# Patient Record
Sex: MALE | Race: WHITE | NOT HISPANIC OR LATINO | URBAN - METROPOLITAN AREA
[De-identification: names, ages, dates, MRNs, and addresses within clinical notes are randomized per-mention and may not be internally consistent; named-entity substitution may affect disease eponyms.]

---

## 2018-12-12 ENCOUNTER — APPOINTMENT (OUTPATIENT)
Dept: RADIOLOGY | Facility: CLINIC | Age: 38
End: 2018-12-12
Payer: COMMERCIAL

## 2018-12-12 ENCOUNTER — OFFICE VISIT (OUTPATIENT)
Dept: URGENT CARE | Facility: CLINIC | Age: 38
End: 2018-12-12
Payer: COMMERCIAL

## 2018-12-12 VITALS
OXYGEN SATURATION: 98 % | DIASTOLIC BLOOD PRESSURE: 86 MMHG | SYSTOLIC BLOOD PRESSURE: 185 MMHG | RESPIRATION RATE: 18 BRPM | TEMPERATURE: 97.9 F | HEART RATE: 70 BPM

## 2018-12-12 DIAGNOSIS — M79.605 LEFT LEG PAIN: ICD-10-CM

## 2018-12-12 DIAGNOSIS — M54.10 RADICULAR PAIN OF LEFT LOWER EXTREMITY: Primary | ICD-10-CM

## 2018-12-12 PROCEDURE — 72110 X-RAY EXAM L-2 SPINE 4/>VWS: CPT

## 2018-12-12 PROCEDURE — 99204 OFFICE O/P NEW MOD 45 MIN: CPT | Performed by: PHYSICIAN ASSISTANT

## 2018-12-12 PROCEDURE — 96372 THER/PROPH/DIAG INJ SC/IM: CPT | Performed by: PHYSICIAN ASSISTANT

## 2018-12-12 PROCEDURE — 73502 X-RAY EXAM HIP UNI 2-3 VIEWS: CPT

## 2018-12-12 RX ORDER — CYCLOBENZAPRINE HCL 10 MG
10 TABLET ORAL 3 TIMES DAILY PRN
Qty: 30 TABLET | Refills: 0 | Status: SHIPPED | OUTPATIENT
Start: 2018-12-12

## 2018-12-12 RX ORDER — KETOROLAC TROMETHAMINE 30 MG/ML
30 INJECTION, SOLUTION INTRAMUSCULAR; INTRAVENOUS ONCE
Status: COMPLETED | OUTPATIENT
Start: 2018-12-12 | End: 2018-12-12

## 2018-12-12 RX ORDER — PREDNISONE 10 MG/1
TABLET ORAL
Qty: 26 TABLET | Refills: 0 | Status: SHIPPED | OUTPATIENT
Start: 2018-12-12

## 2018-12-12 RX ADMIN — KETOROLAC TROMETHAMINE 30 MG: 30 INJECTION, SOLUTION INTRAMUSCULAR; INTRAVENOUS at 12:55

## 2018-12-12 NOTE — PROGRESS NOTES
[address]    NAME: Kaia Maxwell is a 43 y o  male  : 1980    MRN: [id number]  DATE: 2018  TIME: 12:45 PM    Assessment and Plan   Radicular pain of left lower extremity [M54 10]  1  Radicular pain of left lower extremity  ketorolac (TORADOL) injection 30 mg    predniSONE 10 mg tablet    cyclobenzaprine (FLEXERIL) 10 mg tablet   2  Left leg pain  XR spine lumbar minimum 4 views non injury    XR hip/pelv 2-3 vws left if performed    X-rays appear negative  Will follow up with radiologist report when available  Patient Instructions   Patient Instructions   Patient was given a shot of Toradol today to decrease inflammation and pain  Take prednisone as directed  Can use muscle relaxers every 8 hr as needed  Advised not to use if he is going to be driving  If not improving over the next week, follow up with PCP  Chief Complaint     Chief Complaint   Patient presents with    Hip Pain     Pt c/o left hip pain into hi sleg since yesterday  History of Present Illness   39-year-old male here with complaint of left hip pain  Pain started couple days ago  It is radiating into the back of his left thigh  Patient is noticing weakness in his left leg and also a numbness in the anterior lower leg  Denies any current back pain  Patient states that he usually does have some back issues but they usually are in his right lower lumbar area  Currently does not have any back pain  No swelling of the leg  No redness or bruising  Denies any injury  Review of Systems   Review of Systems   Constitutional: Negative for activity change, appetite change, chills, diaphoresis, fatigue, fever and unexpected weight change  HENT: Negative for congestion, ear pain, hearing loss, sinus pressure, sneezing, sore throat and tinnitus  Eyes: Negative for photophobia, redness and visual disturbance     Respiratory: Negative for apnea, cough, chest tightness, shortness of breath, wheezing and stridor  Cardiovascular: Negative for chest pain, palpitations and leg swelling  Gastrointestinal: Negative for abdominal distention, abdominal pain, blood in stool, constipation, diarrhea, nausea and vomiting  Endocrine: Negative for cold intolerance, heat intolerance, polydipsia, polyphagia and polyuria  Genitourinary: Negative for difficulty urinating, dysuria, flank pain, hematuria and urgency  Musculoskeletal: Positive for arthralgias and gait problem  Negative for back pain, myalgias, neck pain and neck stiffness  See HPI   Skin: Negative for rash and wound  Neurological: Positive for numbness  Negative for dizziness, tremors, seizures, syncope, weakness, light-headedness and headaches  Hematological: Negative for adenopathy  Does not bruise/bleed easily  Psychiatric/Behavioral: Negative for agitation, behavioral problems, confusion and decreased concentration  The patient is not nervous/anxious  All other systems reviewed and are negative  Current Medications     Current Outpatient Prescriptions:     cyclobenzaprine (FLEXERIL) 10 mg tablet, Take 1 tablet (10 mg total) by mouth 3 (three) times a day as needed for muscle spasms, Disp: 30 tablet, Rfl: 0    predniSONE 10 mg tablet, Take 3 tabs BID X 2 days, 2 tabs BID X 2 days, 1 tab BID X 2 days, 1 tab daily X 2 days, Disp: 26 tablet, Rfl: 0    Current Facility-Administered Medications:     ketorolac (TORADOL) injection 30 mg, 30 mg, Intramuscular, Once, [employer], PA-C    Current Allergies     Allergies as of 12/12/2018    (No Known Allergies)          The following portions of the patient's history were reviewed and updated as appropriate: allergies, current medications, past family history, past medical history, past social history, past surgical history and problem list    No past medical history on file  No past surgical history on file  No family history on file    Social History     Social History    Marital status: /Civil Union     Spouse name: N/A    Number of children: N/A    Years of education: N/A     Occupational History    Not on file  Social History Main Topics    Smoking status: Not on file    Smokeless tobacco: Not on file    Alcohol use Not on file    Drug use: Unknown    Sexual activity: Not on file     Other Topics Concern    Not on file     Social History Narrative    No narrative on file     Medications have been verified  Objective   BP (!) 185/86   Pulse 70   Temp 97 9 °F (36 6 °C)   Resp 18   SpO2 98%      Physical Exam   Physical Exam   Constitutional: He appears well-developed and well-nourished  No distress  HENT:   Head: Normocephalic  Right Ear: External ear normal    Left Ear: External ear normal    Nose: Nose normal    Mouth/Throat: Oropharynx is clear and moist  No oropharyngeal exudate  Neck: Normal range of motion  Neck supple  Cardiovascular: Normal rate, regular rhythm and normal heart sounds  No murmur heard  Pulmonary/Chest: Effort normal and breath sounds normal  No respiratory distress  He has no wheezes  He has no rales  Abdominal: Soft  Bowel sounds are normal  There is no tenderness  Musculoskeletal:        Left hip: He exhibits decreased range of motion and decreased strength  He exhibits no tenderness  Left knee: He exhibits normal range of motion and no swelling  No tenderness found  Lumbar back: He exhibits normal range of motion and no tenderness  Lymphadenopathy:     He has no cervical adenopathy  Neurological: A sensory deficit (Decreased sensation left anterior lower leg compared to right) is present  Skin: Skin is warm  No rash noted  Nursing note and vitals reviewed

## 2018-12-12 NOTE — PATIENT INSTRUCTIONS
Patient was given a shot of Toradol today to decrease inflammation and pain  Take prednisone as directed  Can use muscle relaxers every 8 hr as needed  Advised not to use if he is going to be driving  If not improving over the next week, follow up with PCP